# Patient Record
Sex: FEMALE | Race: WHITE
[De-identification: names, ages, dates, MRNs, and addresses within clinical notes are randomized per-mention and may not be internally consistent; named-entity substitution may affect disease eponyms.]

---

## 2018-04-12 ENCOUNTER — HOSPITAL ENCOUNTER (OUTPATIENT)
Dept: HOSPITAL 17 - HRAD | Age: 83
Discharge: HOME | End: 2018-04-12
Attending: INTERNAL MEDICINE
Payer: COMMERCIAL

## 2018-04-12 VITALS
RESPIRATION RATE: 18 BRPM | HEART RATE: 77 BPM | SYSTOLIC BLOOD PRESSURE: 134 MMHG | OXYGEN SATURATION: 97 % | DIASTOLIC BLOOD PRESSURE: 66 MMHG

## 2018-04-12 VITALS
DIASTOLIC BLOOD PRESSURE: 124 MMHG | SYSTOLIC BLOOD PRESSURE: 214 MMHG | OXYGEN SATURATION: 98 % | HEART RATE: 83 BPM | RESPIRATION RATE: 20 BRPM | TEMPERATURE: 97.5 F

## 2018-04-12 VITALS
RESPIRATION RATE: 20 BRPM | OXYGEN SATURATION: 95 % | DIASTOLIC BLOOD PRESSURE: 87 MMHG | SYSTOLIC BLOOD PRESSURE: 146 MMHG | HEART RATE: 82 BPM

## 2018-04-12 VITALS — HEIGHT: 64 IN | WEIGHT: 260.15 LBS | BODY MASS INDEX: 44.41 KG/M2

## 2018-04-12 VITALS
DIASTOLIC BLOOD PRESSURE: 58 MMHG | SYSTOLIC BLOOD PRESSURE: 136 MMHG | RESPIRATION RATE: 18 BRPM | HEART RATE: 70 BPM | OXYGEN SATURATION: 97 %

## 2018-04-12 VITALS
OXYGEN SATURATION: 98 % | RESPIRATION RATE: 18 BRPM | SYSTOLIC BLOOD PRESSURE: 118 MMHG | HEART RATE: 84 BPM | DIASTOLIC BLOOD PRESSURE: 63 MMHG

## 2018-04-12 VITALS
RESPIRATION RATE: 20 BRPM | SYSTOLIC BLOOD PRESSURE: 145 MMHG | TEMPERATURE: 97.7 F | HEART RATE: 78 BPM | OXYGEN SATURATION: 95 % | DIASTOLIC BLOOD PRESSURE: 71 MMHG

## 2018-04-12 VITALS
HEART RATE: 72 BPM | SYSTOLIC BLOOD PRESSURE: 134 MMHG | RESPIRATION RATE: 18 BRPM | DIASTOLIC BLOOD PRESSURE: 69 MMHG | OXYGEN SATURATION: 95 %

## 2018-04-12 VITALS
DIASTOLIC BLOOD PRESSURE: 60 MMHG | RESPIRATION RATE: 18 BRPM | SYSTOLIC BLOOD PRESSURE: 134 MMHG | OXYGEN SATURATION: 96 % | HEART RATE: 68 BPM

## 2018-04-12 VITALS
SYSTOLIC BLOOD PRESSURE: 145 MMHG | DIASTOLIC BLOOD PRESSURE: 85 MMHG | RESPIRATION RATE: 18 BRPM | OXYGEN SATURATION: 96 % | HEART RATE: 77 BPM

## 2018-04-12 DIAGNOSIS — C34.91: Primary | ICD-10-CM

## 2018-04-12 DIAGNOSIS — J44.9: ICD-10-CM

## 2018-04-12 DIAGNOSIS — E11.9: ICD-10-CM

## 2018-04-12 DIAGNOSIS — I10: ICD-10-CM

## 2018-04-12 PROCEDURE — 77012 CT SCAN FOR NEEDLE BIOPSY: CPT

## 2018-04-12 PROCEDURE — 88305 TISSUE EXAM BY PATHOLOGIST: CPT

## 2018-04-12 PROCEDURE — 71045 X-RAY EXAM CHEST 1 VIEW: CPT

## 2018-04-12 PROCEDURE — 88342 IMHCHEM/IMCYTCHM 1ST ANTB: CPT

## 2018-04-12 PROCEDURE — 32405: CPT

## 2018-04-12 PROCEDURE — 88341 IMHCHEM/IMCYTCHM EA ADD ANTB: CPT

## 2018-04-12 PROCEDURE — 99152 MOD SED SAME PHYS/QHP 5/>YRS: CPT

## 2018-04-12 NOTE — RADRPT
EXAM DATE/TIME:  04/12/2018 08:04 

 

[facility] COMPARISON:     

No previous studies available for comparison. However, correlation was performed with outside chest C
T and PET CT examination.

 

 

 

INDICATIONS :      

Right lung mass

                     

 

 

 

SEDATION TIME:       

15 minutes

 

 

BIOPSY SITE:        

                     

 

MEDICATION(S):

1.) 2 mg midazolam (Versed) IV  

2.) 100 mcg fentanyl (Sublimaze) IV  

 

 

DEVICE(S):

1.) 18 gauge  introducer

2.) 20 gauge Temno core biopsy needle 

                     

 

MEDICAL HISTORY :     

Carcinoma, lung. Chronic obstructive pulmonary disease. Hypertension. Diabetes

 

SURGICAL HISTORY :     

Appendectomy.   

 

ENCOUNTER:     

Initial

 

ACUITY:     

1 day

 

PAIN SCORE:     

0/10

 

LOCATION:     

Right chest

                     

A total of five core specimen(s) were obtained and sent to the laboratory for pathologic evaluation.

 

 

PROCEDURE:

1.   CT guided lung biopsy.

 

 

Prior to the procedure informed consent was obtained.  Any appropriate prior imaging studies were rev
iewed. Using automated exposure control and adjustment of the mA and/or kV according to patient size,
 radiation dose was kept as low as reasonably achievable to obtain optimal diagnostic quality images.
  DICOM format image data is available electronically for review and comparison.  

 

The site was prepped in a sterile fashion.  Full sterile technique was used, including cap, mask, austen
rile gloves and gown and a large sterile sheet.  Hand hygiene and 2% chlorhexidine and/or betadine/al
cohol prep was utilized per protocol for cutaneous antisepsis.  The skin and subcutaneous tissues wer
e infiltrated with local anesthetic solution.

 

With CT guidance the right upper lobe lung mass was localized. Biopsy was performed using the prescri
bed needle as above.  Adequate hemostasis was obtained with compression at the puncture site.

 

Follow-up CT scan reveals no pneumothorax. There is perilesional hemorrhage in the right upper lobe.

 

Conscious sedation was performed with the prescribed dosages and duration as above in the presence of
 an independent trained radiology nurse to assist in the monitoring of the patient.  EKG and oximetry
 remained stable throughout the procedure. The patient tolerated the procedure well and there were no
 complications. The patient was sent to Radiology Outpatient Unit in stable condition.

 

CONCLUSION:     

Uncomplicated CT guided biopsy of the right upper lobe lung mass.

 

 

 

 Nikita Solis MD on April 12, 2018 at 9:40           

Board Certified Radiologist.

 This report was verified electronically.

## 2018-04-12 NOTE — PD.RAD
Post CT Procedure Prog Note


Pre Procedure Diagnosis:  


(1) Mass of right lung


(2) Lung cancer


Post Procedure Diagnosis:  


(1) Lung cancer


(2) Mass of right lung


Procedure Date:


Apr 12, 2018


Supervising Radiologist:


Nikita Solis


Estimated blood loss:


none


Anesthesia:  Conscious Sedation


Plan of Activity


Patient to Unit:  [facility]


Patient Condition:  Good


See PACS Report for procedural detail/treatment





Biopsy


Imaging Guidance:  CT


Side:  Right


Biopsy Procedure:  Lung


Site:


right upper lobe


Specimen:  Core Biopsy


Additional Detail:


5 20G cores obtained.


Plan


to [facility] for monitoring and post cxr











Nikita Solis MD Apr 12, 2018 08:37

## 2018-04-12 NOTE — RADRPT
EXAM DATE/TIME:  04/12/2018 09:54 

 

[facility] COMPARISON:     

No previous studies available for comparison.

 

                     

INDICATIONS :     

Post right ling biopsy.

                     

 

MEDICAL HISTORY :     

Carcinoma, lung.  Hypertension  Diabetes mellitus type II.   COPD   

 

SURGICAL HISTORY :     

Appendectomy.   

 

ENCOUNTER:     

Subsequent                                        

 

ACUITY:     

1 day      

 

PAIN SCORE:     

1/10

 

LOCATION:     

Right chest 

 

FINDINGS:     

Upright AP and untoward view of the chest demonstrates no pneumothorax following recent right upper l
obe lung mass biopsy. The right upper lobe and right lower lobe lung masses remain visualized.

 

CONCLUSION:     

No pneumothorax following recent right lung mass biopsy.

 

 

 

 Nikita Solis MD on April 12, 2018 at 10:22           

Board Certified Radiologist.

 This report was verified electronically.

## 2018-04-12 NOTE — RADRPT
EXAM DATE/TIME:  04/12/2018 11:00 

 

[facility] COMPARISON:     

CHEST EXPIRATION ONLY, April 12, 2018, 9:54.

 

                     

INDICATIONS :     

Evaluate for pneumothorax post lung biopsy

                     

 

MEDICAL HISTORY :            

Carcinoma, lung. Hypertension Diabetes mellitus type II. COPD   

 

SURGICAL HISTORY :     

Appendectomy.   

 

ENCOUNTER:     

Subsequent                                        

 

ACUITY:     

1 day      

 

PAIN SCORE:     

0/10

 

LOCATION:      

chest 

 

FINDINGS:     

Upright expiratory view of the chest demonstrates no pneumothorax following right lung mass biopsy. O
therwise, stable exam.

 

CONCLUSION:     No pneumothorax.

 

 

 

 Nikita Solis MD on April 12, 2018 at 11:35           

Board Certified Radiologist.

 This report was verified electronically.